# Patient Record
Sex: MALE | Race: WHITE | Employment: FULL TIME | URBAN - METROPOLITAN AREA
[De-identification: names, ages, dates, MRNs, and addresses within clinical notes are randomized per-mention and may not be internally consistent; named-entity substitution may affect disease eponyms.]

---

## 2018-01-10 ENCOUNTER — TRANSFERRED RECORDS (OUTPATIENT)
Dept: HEALTH INFORMATION MANAGEMENT | Facility: CLINIC | Age: 58
End: 2018-01-10

## 2019-02-02 ENCOUNTER — TELEPHONE (OUTPATIENT)
Dept: FAMILY MEDICINE | Facility: OTHER | Age: 59
End: 2019-02-02

## 2019-02-02 NOTE — TELEPHONE ENCOUNTER
Patient is requesting a refill of a medication that is not on his med list: Lipitor 80mg -1 tablet at bedtime #90 that was prescribed by Dr. Shanita Laurent.  If you are ok with filling that for him, please send new RX to [facility], thanks.    Anna Alegria-Technician  [facility]  [phone]

## 2019-02-08 NOTE — TELEPHONE ENCOUNTER
This was routed back to me with no notes, is this going to be refilled or should I call patient to make appointment with you?  Thanks.    Anna Alegria-Technician  [facility]  [phone]

## 2019-02-11 NOTE — TELEPHONE ENCOUNTER
Per [facility] message from Dr Stover.    [2/11/2019 11:21 AM] Ernesto Stover P:   Patient not seen by me for >5 years and not seen in clinic for 2 years. Needs clinic appointment. That should have been routed back.    Will advise patient he needs office visit and labwork.       Addison Antunez [facility] [phone]

## 2019-12-15 ENCOUNTER — HOSPITAL ENCOUNTER (EMERGENCY)
Facility: CLINIC | Age: 59
Discharge: HOME | End: 2019-12-15
Attending: EMERGENCY MEDICINE | Admitting: EMERGENCY MEDICINE
Payer: COMMERCIAL

## 2019-12-15 ENCOUNTER — APPOINTMENT (OUTPATIENT)
Dept: CT IMAGING | Facility: CLINIC | Age: 59
End: 2019-12-15
Attending: EMERGENCY MEDICINE
Payer: COMMERCIAL

## 2019-12-15 VITALS
TEMPERATURE: 97.5 F | HEART RATE: 61 BPM | HEIGHT: 75 IN | OXYGEN SATURATION: 99 % | DIASTOLIC BLOOD PRESSURE: 66 MMHG | SYSTOLIC BLOOD PRESSURE: 124 MMHG | RESPIRATION RATE: 16 BRPM | WEIGHT: 235 LBS | BODY MASS INDEX: 29.22 KG/M2

## 2019-12-15 DIAGNOSIS — N20.1 URETEROLITHIASIS: ICD-10-CM

## 2019-12-15 LAB
ALBUMIN UR-MCNC: NEGATIVE MG/DL
ANION GAP SERPL CALCULATED.3IONS-SCNC: 7 MMOL/L (ref 3–14)
APPEARANCE UR: CLEAR
BASOPHILS # BLD AUTO: 0.1 10E9/L (ref 0–0.2)
BASOPHILS NFR BLD AUTO: 0.7 %
BILIRUB UR QL STRIP: NEGATIVE
BUN SERPL-MCNC: 20 MG/DL (ref 7–30)
CALCIUM SERPL-MCNC: 9.2 MG/DL (ref 8.5–10.1)
CHLORIDE SERPL-SCNC: 106 MMOL/L (ref 94–109)
CO2 SERPL-SCNC: 26 MMOL/L (ref 20–32)
COLOR UR AUTO: YELLOW
CREAT SERPL-MCNC: 1.07 MG/DL (ref 0.66–1.25)
DIFFERENTIAL METHOD BLD: ABNORMAL
EOSINOPHIL NFR BLD AUTO: 0.2 %
ERYTHROCYTE [DISTWIDTH] IN BLOOD BY AUTOMATED COUNT: 12.6 % (ref 10–15)
GFR SERPL CREATININE-BSD FRML MDRD: 76 ML/MIN/{1.73_M2}
GLUCOSE SERPL-MCNC: 174 MG/DL (ref 70–99)
GLUCOSE UR STRIP-MCNC: NEGATIVE MG/DL
HCT VFR BLD AUTO: 48.9 % (ref 40–53)
HGB BLD-MCNC: 16.6 G/DL (ref 13.3–17.7)
HGB UR QL STRIP: NEGATIVE
IMM GRANULOCYTES # BLD: 0 10E9/L (ref 0–0.4)
IMM GRANULOCYTES NFR BLD: 0.3 %
KETONES UR STRIP-MCNC: NEGATIVE MG/DL
LEUKOCYTE ESTERASE UR QL STRIP: NEGATIVE
LYMPHOCYTES # BLD AUTO: 0.6 10E9/L (ref 0.8–5.3)
LYMPHOCYTES NFR BLD AUTO: 5.5 %
MCH RBC QN AUTO: 29.5 PG (ref 26.5–33)
MCHC RBC AUTO-ENTMCNC: 33.9 G/DL (ref 31.5–36.5)
MCV RBC AUTO: 87 FL (ref 78–100)
MONOCYTES # BLD AUTO: 0.6 10E9/L (ref 0–1.3)
MONOCYTES NFR BLD AUTO: 5.5 %
MUCOUS THREADS #/AREA URNS LPF: PRESENT /LPF
NEUTROPHILS # BLD AUTO: 10.1 10E9/L (ref 1.6–8.3)
NEUTROPHILS NFR BLD AUTO: 87.8 %
NITRATE UR QL: NEGATIVE
NRBC # BLD AUTO: 0 10*3/UL
NRBC BLD AUTO-RTO: 0 /100
PH UR STRIP: 7 PH (ref 5–7)
PLATELET # BLD AUTO: 307 10E9/L (ref 150–450)
POTASSIUM SERPL-SCNC: 4.6 MMOL/L (ref 3.4–5.3)
RBC # BLD AUTO: 5.62 10E12/L (ref 4.4–5.9)
RBC #/AREA URNS AUTO: <1 /HPF (ref 0–2)
SODIUM SERPL-SCNC: 139 MMOL/L (ref 133–144)
SOURCE: ABNORMAL
SP GR UR STRIP: 1.03 (ref 1–1.03)
SQUAMOUS #/AREA URNS AUTO: <1 /HPF (ref 0–1)
UROBILINOGEN UR STRIP-MCNC: 0 MG/DL (ref 0–2)
WBC # BLD AUTO: 11.6 10E9/L (ref 4–11)
WBC #/AREA URNS AUTO: 5 /HPF (ref 0–5)

## 2019-12-15 PROCEDURE — 96374 THER/PROPH/DIAG INJ IV PUSH: CPT

## 2019-12-15 PROCEDURE — 81001 URINALYSIS AUTO W/SCOPE: CPT | Performed by: EMERGENCY MEDICINE

## 2019-12-15 PROCEDURE — 99284 EMERGENCY DEPT VISIT MOD MDM: CPT | Mod: Z6 | Performed by: EMERGENCY MEDICINE

## 2019-12-15 PROCEDURE — 74176 CT ABD & PELVIS W/O CONTRAST: CPT

## 2019-12-15 PROCEDURE — 25000128 H RX IP 250 OP 636: Performed by: EMERGENCY MEDICINE

## 2019-12-15 PROCEDURE — 96361 HYDRATE IV INFUSION ADD-ON: CPT

## 2019-12-15 PROCEDURE — 80048 BASIC METABOLIC PNL TOTAL CA: CPT | Performed by: EMERGENCY MEDICINE

## 2019-12-15 PROCEDURE — 96375 TX/PRO/DX INJ NEW DRUG ADDON: CPT

## 2019-12-15 PROCEDURE — 25800030 ZZH RX IP 258 OP 636: Performed by: EMERGENCY MEDICINE

## 2019-12-15 PROCEDURE — 85025 COMPLETE CBC W/AUTO DIFF WBC: CPT | Performed by: EMERGENCY MEDICINE

## 2019-12-15 PROCEDURE — 99285 EMERGENCY DEPT VISIT HI MDM: CPT | Mod: 25

## 2019-12-15 RX ORDER — HYDROCODONE BITARTRATE AND ACETAMINOPHEN 5; 325 MG/1; MG/1
1 TABLET ORAL EVERY 4 HOURS PRN
Qty: 10 TABLET | Refills: 0 | Status: SHIPPED | OUTPATIENT
Start: 2019-12-15 | End: 2019-12-18

## 2019-12-15 RX ORDER — ONDANSETRON 2 MG/ML
4 INJECTION INTRAMUSCULAR; INTRAVENOUS EVERY 30 MIN PRN
Status: DISCONTINUED | OUTPATIENT
Start: 2019-12-15 | End: 2019-12-15 | Stop reason: HOSPADM

## 2019-12-15 RX ORDER — ONDANSETRON 4 MG/1
4 TABLET, ORALLY DISINTEGRATING ORAL EVERY 6 HOURS PRN
Qty: 5 TABLET | Refills: 0 | Status: SHIPPED | OUTPATIENT
Start: 2019-12-15 | End: 2019-12-22

## 2019-12-15 RX ORDER — KETOROLAC TROMETHAMINE 30 MG/ML
30 INJECTION, SOLUTION INTRAMUSCULAR; INTRAVENOUS ONCE
Status: COMPLETED | OUTPATIENT
Start: 2019-12-15 | End: 2019-12-15

## 2019-12-15 RX ADMIN — ONDANSETRON 4 MG: 2 INJECTION INTRAMUSCULAR; INTRAVENOUS at 11:12

## 2019-12-15 RX ADMIN — KETOROLAC TROMETHAMINE 30 MG: 30 INJECTION, SOLUTION INTRAMUSCULAR at 11:13

## 2019-12-15 RX ADMIN — SODIUM CHLORIDE 1000 ML: 9 INJECTION, SOLUTION INTRAVENOUS at 11:11

## 2019-12-15 ASSESSMENT — ENCOUNTER SYMPTOMS
CONFUSION: 0
FLANK PAIN: 1
ABDOMINAL PAIN: 0
FEVER: 0
SHORTNESS OF BREATH: 0
COLOR CHANGE: 0
EYE REDNESS: 0
HEADACHES: 0
ARTHRALGIAS: 0
NECK STIFFNESS: 0
DIFFICULTY URINATING: 0

## 2019-12-15 ASSESSMENT — MIFFLIN-ST. JEOR: SCORE: 1976.58

## 2019-12-15 NOTE — ED TRIAGE NOTES
"Presents with concerns for R) flank pain since waking today. He reports a \"dull pressure that gets better at times.\" One episode of vomiting but remains slightly nauseated. Anna Daley RN   "

## 2019-12-15 NOTE — ED PROVIDER NOTES
History     Chief Complaint   Patient presents with     Flank Pain     The history is provided by the patient.     Bradly Dent is a 59 year old male who presents to the ED complaining of right flank pain that started at 0600 this morning. Patient stated he went to the bathroom to have a BM and he experienced sharp right back/ side pain. He has not had this pain before and had no pain last night.  Patient rated his pain at a 6/10. He has not taken any medications for his pain.      Allergies:  Allergies   Allergen Reactions     No Known Drug Allergies        Problem List:    Patient Active Problem List    Diagnosis Date Noted     Hyperlipidemia LDL goal <70 03/25/2013     Priority: Medium     Hypertension goal BP (blood pressure) < 140/90 03/25/2013     Priority: Medium     MI, acute, non ST segment elevation (H) 03/07/2013     Priority: Medium     [facility]          Past Medical History:    Past Medical History:   Diagnosis Date     Closed fracture of unspecified part of tibia      MI, acute, non ST segment elevation (H) 3/7/2013       Past Surgical History:    Past Surgical History:   Procedure Laterality Date     ANGIOPLASTY  3/7/2013    Stent X 2     COLONOSCOPY  7/16/2013    Procedure: COMBINED COLONOSCOPY, SINGLE BIOPSY/POLYPECTOMY BY BIOPSY;  Colonoscopy, polypectomy by biospy.;  Surgeon: Rusty Crowley MD;  Location: [facility]     HC REMOVAL OF TONSILS,<13 Y/O      as a child     HERNIA REPAIR, INGUINAL RT/LT  12/22/2008    bilateral       Family History:    Family History   Problem Relation Age of Onset     Anesthesia Reaction No family hx of      C.A.D. Father         MI in 80s     Diabetes Father      Cerebrovascular Disease No family hx of      Hypertension No family hx of      Lipids No family hx of        Social History:  Marital Status:  Single [1]  Social History     Tobacco Use     Smoking status: Never Smoker     Smokeless tobacco: Never Used   Substance Use Topics     Alcohol use: No      "Alcohol/week: 0.0 standard drinks     Drug use: No        Medications:    aspirin 81 MG tablet  HYDROcodone-acetaminophen (NORCO) 5-325 MG tablet  ondansetron (ZOFRAN ODT) 4 MG ODT tab          Review of Systems   Constitutional: Negative for fever.   HENT: Negative for congestion.    Eyes: Negative for redness.   Respiratory: Negative for shortness of breath.    Cardiovascular: Negative for chest pain.   Gastrointestinal: Negative for abdominal pain.   Genitourinary: Positive for flank pain (right). Negative for difficulty urinating.   Musculoskeletal: Negative for arthralgias and neck stiffness.   Skin: Negative for color change.   Neurological: Negative for headaches.   Psychiatric/Behavioral: Negative for confusion.   All other systems reviewed and are negative.      Physical Exam   BP: 126/63  Pulse: 57  Temp: 97.5  F (36.4  C)  Resp: 16  Height: 190.5 cm (6' 3\")  Weight: 106.6 kg (235 lb)  SpO2: 99 %      Physical Exam  Vitals signs and nursing note reviewed.   Constitutional:       General: He is not in acute distress.     Appearance: Normal appearance. He is not toxic-appearing.   HENT:      Head: Normocephalic and atraumatic.      Right Ear: External ear normal.      Left Ear: External ear normal.      Nose: Nose normal.      Mouth/Throat:      Mouth: Mucous membranes are moist.      Pharynx: No oropharyngeal exudate or posterior oropharyngeal erythema.   Eyes:      General: No scleral icterus.     Extraocular Movements: Extraocular movements intact.   Neck:      Musculoskeletal: Normal range of motion.   Cardiovascular:      Rate and Rhythm: Normal rate and regular rhythm.      Heart sounds: Normal heart sounds.   Pulmonary:      Effort: Pulmonary effort is normal. No respiratory distress.      Breath sounds: Normal breath sounds. No stridor. No wheezing, rhonchi or rales.   Abdominal:      General: There is no distension.      Tenderness: There is no abdominal tenderness.   Musculoskeletal: Normal range " of motion.   Skin:     General: Skin is warm and dry.      Coloration: Skin is not jaundiced.      Findings: No erythema.   Neurological:      General: No focal deficit present.      Mental Status: He is alert and oriented to person, place, and time. Mental status is at baseline.      Cranial Nerves: No cranial nerve deficit.      Motor: No weakness.   Psychiatric:         Mood and Affect: Mood normal.         Behavior: Behavior normal.         Thought Content: Thought content normal.         ED Course        Procedures               Critical Care time:  none               Results for orders placed or performed during the hospital encounter of 12/15/19 (from the past 24 hour(s))   CBC with platelets differential   Result Value Ref Range    WBC 11.6 (H) 4.0 - 11.0 10e9/L    RBC Count 5.62 4.4 - 5.9 10e12/L    Hemoglobin 16.6 13.3 - 17.7 g/dL    Hematocrit 48.9 40.0 - 53.0 %    MCV 87 78 - 100 fl    MCH 29.5 26.5 - 33.0 pg    MCHC 33.9 31.5 - 36.5 g/dL    RDW 12.6 10.0 - 15.0 %    Platelet Count 307 150 - 450 10e9/L    Diff Method Automated Method     % Neutrophils 87.8 %    % Lymphocytes 5.5 %    % Monocytes 5.5 %    % Eosinophils 0.2 %    % Basophils 0.7 %    % Immature Granulocytes 0.3 %    Nucleated RBCs 0 0 /100    Absolute Neutrophil 10.1 (H) 1.6 - 8.3 10e9/L    Absolute Lymphocytes 0.6 (L) 0.8 - 5.3 10e9/L    Absolute Monocytes 0.6 0.0 - 1.3 10e9/L    Absolute Basophils 0.1 0.0 - 0.2 10e9/L    Abs Immature Granulocytes 0.0 0 - 0.4 10e9/L    Absolute Nucleated RBC 0.0    Basic metabolic panel   Result Value Ref Range    Sodium 139 133 - 144 mmol/L    Potassium 4.6 3.4 - 5.3 mmol/L    Chloride 106 94 - 109 mmol/L    Carbon Dioxide 26 20 - 32 mmol/L    Anion Gap 7 3 - 14 mmol/L    Glucose 174 (H) 70 - 99 mg/dL    Urea Nitrogen 20 7 - 30 mg/dL    Creatinine 1.07 0.66 - 1.25 mg/dL    GFR Estimate 76 >60 mL/min/[1.73_m2]    GFR Estimate If Black 89 >60 mL/min/[1.73_m2]    Calcium 9.2 8.5 - 10.1 mg/dL   CT Abdomen  Pelvis w/o Contrast    Narrative    CT ABDOMEN PELVIS WITHOUT CONTRAST 12/15/2019 12:02 PM    TECHNIQUE: Images from diaphragm to pubic symphysis without IV  contrast.  Radiation dose for this scan was reduced using automated exposure  control, adjustment of the mA and/or kV according to patient size, or  iterative reconstruction technique.    HISTORY: Flank pain, stone disease suspected - right    COMPARISON: 12/28/2012 CT abdomen and pelvis    FINDINGS:   Abdomen and Pelvis: There is soft tissue stranding and minimal fluid  adjacent to mildly dilated right renal pelvis. 0.2 cm stone at the  right ureterovesical junction series 3 image 76. Additional small  bilateral intrarenal renal kidney stones identified including 0.3 cm  upper right kidney stone and 2 stones in the left kidney, 0.2  centimeters upper pole and 0.3 cm lower left kidney. No evidence for  left hydronephrosis.    Lung bases clear. Within the limits of a noncontrast exam the liver,  spleen, pancreas and adrenal glands are unremarkable. Tiny gallstone  suspected series 4 image 39, gallbladder otherwise appears normal. No  periaortic or pelvic adenopathy. Postop changes along the mid to low  pelvis anterior abdomen. No free fluid. No acute bowel abnormality. No  evidence for acute appendicitis. No aggressive bone lesions.      Impression    IMPRESSION:  1. 0.2 cm right ureteral vesicle junction stone with mild right  hydronephrosis and peripelvic stranding and fluid.  2. Additional tiny bilateral intrarenal kidney stones.    SWEETIE ALVAREZ MD   Routine UA with microscopic   Result Value Ref Range    Color Urine Yellow     Appearance Urine Clear     Glucose Urine Negative NEG^Negative mg/dL    Bilirubin Urine Negative NEG^Negative    Ketones Urine Negative NEG^Negative mg/dL    Specific Gravity Urine 1.026 1.003 - 1.035    Blood Urine Negative NEG^Negative    pH Urine 7.0 5.0 - 7.0 pH    Protein Albumin Urine Negative NEG^Negative mg/dL     Urobilinogen mg/dL 0.0 0.0 - 2.0 mg/dL    Nitrite Urine Negative NEG^Negative    Leukocyte Esterase Urine Negative NEG^Negative    Source Midstream Urine     WBC Urine 5 0 - 5 /HPF    RBC Urine <1 0 - 2 /HPF    Squamous Epithelial /HPF Urine <1 0 - 1 /HPF    Mucous Urine Present (A) NEG^Negative /LPF       Medications   ondansetron (ZOFRAN) injection 4 mg (4 mg Intravenous Given 12/15/19 1112)   0.9% sodium chloride BOLUS (0 mLs Intravenous Stopped 12/15/19 1309)   ketorolac (TORADOL) injection 30 mg (30 mg Intravenous Given 12/15/19 1113)       Assessments & Plan (with Medical Decision Making)  59-year-old male with a 2 mm right ureteral lithiasis at UVJ.  No signs of infection.  Pain improved with Toradol.  Given Norco if needed for home management.  Referred to urology if pain does not subside.     I have reviewed the nursing notes.    I have reviewed the findings, diagnosis, plan and need for follow up with the patient.       Discharge Medication List as of 12/15/2019  1:10 PM      START taking these medications    Details   HYDROcodone-acetaminophen (NORCO) 5-325 MG tablet Take 1 tablet by mouth every 4 hours as needed for severe pain, Disp-10 tablet, R-0, E-Prescribe      ondansetron (ZOFRAN ODT) 4 MG ODT tab Take 1 tablet (4 mg) by mouth every 6 hours as needed for nausea, Disp-5 tablet, R-0, E-Prescribe             Final diagnoses:   Ureterolithiasis   This document serves as a record of services personally performed by Da Carver MD.  It was created on their behalf by Keshia Rosa, a trained medical scribe. The creation of this record is based on the provider's personal observations and the statements of the patient. This document has been checked and approved by the attending provider.    Disclaimer : This note consists of symbols derived from keyboarding, dictation and/or voice recognition software. As a result, there may be errors in the script that have gone undetected. Please consider this when  interpreting information found in this chart.      12/15/2019   [facility] EMERGENCY DEPARTMENT     Da Carver MD  12/15/19 9819

## 2019-12-15 NOTE — DISCHARGE INSTRUCTIONS
Stay hydrated.  Stay active.  Continue to use ibuprofen up to 600 mg 4 times a day for moderate pain.

## 2019-12-15 NOTE — ED AVS SNAPSHOT
[facility] Emergency Department  [address] [place] [address]  Phone:  [phone]  Fax:  [phone]                                    Bradly Dent   MRN: [id number]    Department:  [facility] Emergency Department   Date of Visit:  12/15/2019           After Visit Summary Signature Page    I have received my discharge instructions, and my questions have been answered. I have discussed any challenges I see with this plan with the nurse or doctor.    ..........................................................................................................................................  Patient/Patient Representative Signature      ..........................................................................................................................................  Patient Representative Print Name and Relationship to Patient    ..................................................               ................................................  Date                                   Time    ..........................................................................................................................................  Reviewed by Signature/Title    ...................................................              ..............................................  Date                                               Time          22EPIC Rev 08/18

## 2020-01-01 ENCOUNTER — MEDICAL CORRESPONDENCE (OUTPATIENT)
Dept: HEALTH INFORMATION MANAGEMENT | Facility: CLINIC | Age: 60
End: 2020-01-01

## 2020-01-01 ENCOUNTER — TRANSFERRED RECORDS (OUTPATIENT)
Dept: HEALTH INFORMATION MANAGEMENT | Facility: CLINIC | Age: 60
End: 2020-01-01

## 2020-01-01 LAB
ALT SERPL-CCNC: 44 U/L (ref 8–45)
AST SERPL-CCNC: 55 U/L (ref 5–41)
CHOLEST SERPL-MCNC: 185 MG/DL
CREAT SERPL-MCNC: 0.79 MG/DL (ref 0.72–1.25)
CREAT SERPL-MCNC: 0.85 MG/DL (ref 0.72–1.25)
EJECTION FRACTION: NORMAL %
GFR SERPL CREATININE-BSD FRML MDRD: >60 ML/MIN/1.73M2
GFR SERPL CREATININE-BSD FRML MDRD: >60 ML/MIN/1.73M2
GLUCOSE SERPL-MCNC: 107 MG/DL (ref 70–100)
GLUCOSE SERPL-MCNC: 108 MG/DL (ref 70–100)
HBA1C MFR BLD: 5.9 % (ref 0–5.7)
HDLC SERPL-MCNC: 27 MG/DL
INR PPP: 1.1 (ref 0.9–1.1)
INR PPP: 1.2 (ref 0.9–1.1)
INR PPP: 1.2 (ref 0.9–1.1)
LDLC SERPL CALC-MCNC: 120 MG/DL (ref 0–159)
POTASSIUM SERPL-SCNC: 4 MMOL/L (ref 3.5–5.1)
POTASSIUM SERPL-SCNC: 4 MMOL/L (ref 3.5–5.1)
TRIGL SERPL-MCNC: 191 MG/DL (ref 30–150)

## 2021-01-01 ENCOUNTER — ANESTHESIA EVENT (OUTPATIENT)
Dept: EMERGENCY MEDICINE | Facility: CLINIC | Age: 61
End: 2021-01-01
Payer: COMMERCIAL

## 2021-01-01 ENCOUNTER — TRANSFERRED RECORDS (OUTPATIENT)
Dept: HEALTH INFORMATION MANAGEMENT | Facility: CLINIC | Age: 61
End: 2021-01-01

## 2021-01-01 ENCOUNTER — HOSPITAL ENCOUNTER (EMERGENCY)
Facility: CLINIC | Age: 61
End: 2021-01-19
Attending: EMERGENCY MEDICINE | Admitting: EMERGENCY MEDICINE
Payer: COMMERCIAL

## 2021-01-01 ENCOUNTER — ANESTHESIA (OUTPATIENT)
Dept: EMERGENCY MEDICINE | Facility: CLINIC | Age: 61
End: 2021-01-01
Payer: COMMERCIAL

## 2021-01-01 DIAGNOSIS — I46.9 CARDIAC ARREST (H): ICD-10-CM

## 2021-01-01 PROCEDURE — 99284 EMERGENCY DEPT VISIT MOD MDM: CPT | Performed by: EMERGENCY MEDICINE

## 2021-01-01 PROCEDURE — 31500 INSERT EMERGENCY AIRWAY: CPT | Performed by: EMERGENCY MEDICINE

## 2021-01-01 PROCEDURE — 250N000011 HC RX IP 250 OP 636

## 2021-01-01 PROCEDURE — 99285 EMERGENCY DEPT VISIT HI MDM: CPT | Mod: 25 | Performed by: EMERGENCY MEDICINE

## 2021-01-01 PROCEDURE — 999N000011 HC STATISTIC ANESTHESIA CASE

## 2021-01-01 RX ORDER — SODIUM CHLORIDE 9 MG/ML
INJECTION, SOLUTION INTRAVENOUS CONTINUOUS
Status: DISCONTINUED | OUTPATIENT
Start: 2021-01-01 | End: 2021-01-01 | Stop reason: HOSPADM

## 2021-01-19 NOTE — ANESTHESIA POSTPROCEDURE EVALUATION
Patient: Bradly Dent    * No procedures listed *    Diagnosis:* No pre-op diagnosis entered *  Diagnosis Additional Information: No value filed.    Anesthesia Type:  No value filed.    Note:  Disposition: ED - time of death 0745.   Postop Pain Control:    PONV:    Neuro/Psych:    Airway/Respiratory:             Sign Out: AIRWAY IN SITU/Resp. Support               Airway in situ/Resp. Support: ETT   CV/Hemodynamics:    Other NRE:    DID A NON-ROUTINE EVENT OCCUR?          Last vitals:  There were no vitals filed for this visit.    Electronically Signed By: CHRISTIAN Bustillos CRNA  January 19, 2021  10:01 AM

## 2021-01-19 NOTE — ED PROVIDER NOTES
History     Chief Complaint   Patient presents with     Cardiac Arrest     HPI  Bradly Dent is a 60 year old male who presents by ambulance after he was found down while at work.  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  CPR was immediately initiated and patient was shocked x1 by first responders.  Did not have ROSC at any point during the encounter.  Paramedics did give total of 2 mg of epinephrine, had an airway present and did have the Raz device performing CPR.  Past history shows he did have a MI in 2013 and subsequent angiogram.  Also had STEMIs  on 12/21 and 12/24/2020.  Were able to contact his daughter Brenda after the code had been called and she did state recently he been complaining of ongoing lightheadedness thought to be related to his blood pressure medicines.  He had not had complaints of chest pain that she was aware of.  He had not been ill.  Patient arrives in the department he is unresponsive.  His pupils are fixed and dilated.  No corneal reflex.  He has no spontaneous movement, respirations, and cardiac monitor reveals asystole.  No palpable pulses were appreciated.     Allergies:  Allergies   Allergen Reactions     No Known Drug Allergies        Problem List:    Patient Active Problem List    Diagnosis Date Noted     Hyperlipidemia LDL goal <70 03/25/2013     Priority: Medium     Hypertension goal BP (blood pressure) < 140/90 03/25/2013     Priority: Medium     MI, acute, non ST segment elevation (H) 03/07/2013     Priority: Medium     [facility]          Past Medical History:    Past Medical History:   Diagnosis Date     Closed fracture of unspecified part of tibia      MI, acute, non ST segment elevation (H) 3/7/2013       Past Surgical History:    Past Surgical History:   Procedure Laterality Date     ANGIOPLASTY  3/7/2013    Stent X 2     COLONOSCOPY  7/16/2013    Procedure: COMBINED COLONOSCOPY, SINGLE BIOPSY/POLYPECTOMY BY BIOPSY;   Colonoscopy, polypectomy by joseluis.;  Surgeon: Rusty Crowley MD;  Location:  GI     HC REMOVAL OF TONSILS,<11 Y/O      as a child     HERNIA REPAIR, INGUINAL RT/LT  12/22/2008    bilateral       Family History:    Family History   Problem Relation Age of Onset     Anesthesia Reaction No family hx of      C.A.D. Father         MI in 80s     Diabetes Father      Cerebrovascular Disease No family hx of      Hypertension No family hx of      Lipids No family hx of        Social History:  Marital Status:  Single [1]  Social History     Tobacco Use     Smoking status: Never Smoker     Smokeless tobacco: Never Used   Substance Use Topics     Alcohol use: No     Alcohol/week: 0.0 standard drinks     Drug use: No        Medications:         aspirin 81 MG tablet          Review of Systems due to the patient's condition review of systems was not obtained other than what his daughter relates on the phone.    Physical Exam          Physical Exam upon arrival patient was pulseless, unresponsive, fixed and dilated pupils with no corneal response.  No response to painful stimuli.  No spontaneous respirations.  Patient's extremities were cool but his core temperature was normal.    ED Course        Procedures               Critical Care time:  none               Results for orders placed or performed in visit on 01/19/21 (from the past 24 hour(s))   Airway    Narrative    Wendy Robles APRN CRNA     1/19/2021  8:04 AM  Airway   Date/Time: 1/19/2021 7:45 AM   Patient location during procedure: ED  Staff -   CRNA: Wendy Robles APRN CRNA  Performed By: CRNA    Consent for Airway   Urgency: emergentConsent: The procedure was performed in an emergent   situation.      Indications and Patient Condition  Indications for airway management: cardiovascular arrest  Mallampati: ADRIANO.Mask difficulty assessment: 0 - not attempted (Ventilation   with igel LMA prior to intubation)    Final Airway Details  Final airway  type: endotracheal airway  Successful airway:ETT - single  Endotracheal Airway Details   ETT size (mm): 8.0  Cuffed: yes  Cuff volume (mL): 10  Successful intubation technique: video laryngoscopy  Grade View of Cords: 1  Adjucts: stylet  Measured from: lips  Secured at (cm): 23  Secured with: other (comment) (Secured per RT)    Post intubation assessment   No apparent complications (Ventilation and sedation (as necessary) to be   determined by ED physician)  Placement verified by: capnometry, equal breath sounds and chest rise   Number of attempts at approach: 1  Number of other approaches attempted: 0  Secured with:other (comment) (Secured per RT)  Ease of procedure: easy  Dentition: Intact and UnchangedAdditional Comments  Atraumatic ETT placement. No sedation or analgesia given due to CV arrest.            Medications   0.9% sodium chloride BOLUS (has no administration in time range)     Followed by   sodium chloride 0.9% infusion (has no administration in time range)     Please see resuscitation record.  Patient was given 2 additional doses of 1 mg of epinephrine through the IO.  Continued Raz for compressions and anesthesia did intubate with a 8.0 ET tube.  Despite these attempts and interventions patient remained either in asystole or PEA and that 7:46 a.m. the code was discontinued.  Time of death was 7:46 AM.  Assessments & Plan (with Medical Decision Making)   Bradly Dent is a 60 year old male who presents by ambulance after he was found down while at work.  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  CPR was immediately initiated and patient was shocked x1 by first responders.  Did not have ROSC at any point during the encounter.  Paramedics did give total of 2 mg of epinephrine, had an airway present and did have the Raz device performing CPR.  Past history shows he did have a MI in 2013 and subsequent angiogram.  Also had STEMIs  on 12/21 and 12/24/2020.  Were  able to contact his daughter Brenda after the code had been called and she did state recently he been complaining of ongoing lightheadedness thought to be related to his blood pressure medicines.  He had not had complaints of chest pain that she was aware of.  He had not been ill.  Patient arrives in the department he is unresponsive.  His pupils are fixed and dilated.  No corneal reflex.  He has no spontaneous movement, respirations, and cardiac monitor reveals asystole.  No palpable pulses were appreciated.  Despite intubations continued cardiac compressions and repeated epinephrine dosage patient never had ROSC or a shockable pulse.  Time of death was 7:46 AM.   is contacted.  I spoke to his daughter Brenda who will contact her sister and will present to the department.  I have reviewed the nursing notes.    I have reviewed the findings, diagnosis, plan and need for follow up with the patient.       New Prescriptions    No medications on file       Final diagnoses:   Cardiac arrest (H)       1/19/2021   [facility] EMERGENCY DEPT     Hai Mitchell MD  01/19/21 6514

## 2021-01-19 NOTE — ANESTHESIA PROCEDURE NOTES
Airway   Date/Time: 1/19/2021 7:44 AM   Patient location during procedure: ED  Staff -   CRNA: Wendy Robles APRN CRNA  Performed By: CRNA    Consent for Airway   Urgency: emergentConsent: The procedure was performed in an emergent situation.      Indications and Patient Condition  Indications for airway management: cardiovascular arrest  Mallampati: ADRIANO.Mask difficulty assessment: 0 - not attempted (Ventilation with igel LMA prior to intubation)    Final Airway Details  Final airway type: endotracheal airway  Successful airway:ETT - single  Endotracheal Airway Details   ETT size (mm): 8.0  Cuffed: yes  Cuff volume (mL): 10  Successful intubation technique: video laryngoscopy  Grade View of Cords: 1  Adjucts: stylet  Measured from: lips  Secured at (cm): 23  Secured with: other (comment) (Secured per RT)    Post intubation assessment   No apparent complications (Ventilation and sedation (as necessary) to be determined by ED physician)  Placement verified by: capnometry, equal breath sounds and chest rise   Number of attempts at approach: 1  Number of other approaches attempted: 0  Secured with:other (comment) (Secured per RT)  Ease of procedure: easy  Dentition: Intact and UnchangedAdditional Comments  Atraumatic ETT placement. No sedation or analgesia given due to CV arrest.

## 2021-01-19 NOTE — ANESTHESIA PREPROCEDURE EVALUATION
Anesthesia Pre-Procedure Evaluation    Patient: Bradly Dent   MRN: [id number] : 1960        Preoperative Diagnosis: * No surgery found *   Procedure :      Past Medical History:   Diagnosis Date     Closed fracture of unspecified part of tibia      MI, acute, non ST segment elevation (H) 3/7/2013    [facility]      Past Surgical History:   Procedure Laterality Date     ANGIOPLASTY  3/7/2013    Stent X 2     COLONOSCOPY  2013    Procedure: COMBINED COLONOSCOPY, SINGLE BIOPSY/POLYPECTOMY BY BIOPSY;  Colonoscopy, polypectomy by biospy.;  Surgeon: Rusty Crowley MD;  Location: [facility]     HC REMOVAL OF TONSILS,<13 Y/O      as a child     HERNIA REPAIR, INGUINAL RT/LT  2008    bilateral      Allergies   Allergen Reactions     No Known Drug Allergies       Social History     Tobacco Use     Smoking status: Never Smoker     Smokeless tobacco: Never Used   Substance Use Topics     Alcohol use: No     Alcohol/week: 0.0 standard drinks      Wt Readings from Last 1 Encounters:   12/15/19 106.6 kg (235 lb)              OUTSIDE LABS:  CBC:   Lab Results   Component Value Date    WBC 11.6 (H) 12/15/2019    WBC 9.7 2013    HGB 16.6 12/15/2019    HGB 15.1 2013    HCT 48.9 12/15/2019    HCT 44.0 2013     12/15/2019     2013     BMP:   Lab Results   Component Value Date     12/15/2019     01/15/2016    POTASSIUM 4.0 2020    POTASSIUM 4.0 2020    CHLORIDE 106 12/15/2019    CHLORIDE 107 01/15/2016    CO2 26 12/15/2019    CO2 22 01/15/2016    BUN 20 12/15/2019    BUN 21 01/15/2016    CR 0.79 2020    CR 0.85 2020     (H) 2020     (H) 2020     COAGS:   Lab Results   Component Value Date    INR 1.2 (H) 2020    INR 1.2 (A) 2020     POC: No results found for: BGM, HCG, HCGS  HEPATIC:   Lab Results   Component Value Date    ALBUMIN 4.1 2012    PROTTOTAL 7.3 2012    ALT 44 2020    AST 55 (H)  12/25/2020    ALKPHOS 114 12/21/2012    BILITOTAL 1.7 (H) 12/21/2012     OTHER:   Lab Results   Component Value Date    A1C 5.9 (H) 12/22/2020    BYRON 9.2 12/15/2019    CRP 25.4 (H) 01/13/2013    SED 51 (H) 01/13/2013       Anesthesia Plan   Procedure only, no anesthetic delivered    History & Physical Review      ASA Status:  5. emergent.   Plan for     Additional equipment: Videolaryngoscope.      Comments: Patient arrival without organized rhythm and no pulse. Igel LMA in place with adequate ventilation. Decision to convert to ETT for airway securement..       Consents        Postoperative Care            Wendy Robles, APRN CRNA